# Patient Record
Sex: FEMALE
[De-identification: names, ages, dates, MRNs, and addresses within clinical notes are randomized per-mention and may not be internally consistent; named-entity substitution may affect disease eponyms.]

---

## 2017-12-29 ENCOUNTER — HOSPITAL ENCOUNTER (OUTPATIENT)
Dept: HOSPITAL 88 - PT | Age: 67
LOS: 2 days | End: 2017-12-31
Attending: SPECIALIST
Payer: COMMERCIAL

## 2017-12-29 DIAGNOSIS — M25.662: ICD-10-CM

## 2017-12-29 DIAGNOSIS — Z96.652: Primary | ICD-10-CM

## 2017-12-29 DIAGNOSIS — R26.2: ICD-10-CM

## 2017-12-29 DIAGNOSIS — M25.562: ICD-10-CM

## 2017-12-29 DIAGNOSIS — M62.81: ICD-10-CM

## 2017-12-29 DIAGNOSIS — Z47.1: ICD-10-CM

## 2018-01-31 ENCOUNTER — HOSPITAL ENCOUNTER (OUTPATIENT)
Dept: HOSPITAL 88 - PT | Age: 68
End: 2018-01-31
Attending: SPECIALIST
Payer: COMMERCIAL

## 2018-01-31 DIAGNOSIS — Z96.652: Primary | ICD-10-CM

## 2018-01-31 DIAGNOSIS — M25.562: ICD-10-CM

## 2018-01-31 DIAGNOSIS — M62.81: ICD-10-CM

## 2018-01-31 DIAGNOSIS — Z47.1: ICD-10-CM

## 2018-01-31 DIAGNOSIS — M25.662: ICD-10-CM

## 2018-01-31 DIAGNOSIS — R26.2: ICD-10-CM

## 2018-02-02 ENCOUNTER — HOSPITAL ENCOUNTER (OUTPATIENT)
Dept: HOSPITAL 88 - PT | Age: 68
LOS: 26 days | End: 2018-02-28
Attending: SPECIALIST
Payer: COMMERCIAL

## 2018-02-02 DIAGNOSIS — M25.661: ICD-10-CM

## 2018-02-02 DIAGNOSIS — Z96.652: Primary | ICD-10-CM

## 2018-02-02 DIAGNOSIS — Z47.1: ICD-10-CM

## 2018-02-02 DIAGNOSIS — M25.562: ICD-10-CM

## 2018-07-23 ENCOUNTER — HOSPITAL ENCOUNTER (OUTPATIENT)
Dept: HOSPITAL 88 - MAMMO | Age: 68
End: 2018-07-23
Attending: INTERNAL MEDICINE
Payer: COMMERCIAL

## 2018-07-23 DIAGNOSIS — Z12.31: Primary | ICD-10-CM

## 2018-07-23 PROCEDURE — 77067 SCR MAMMO BI INCL CAD: CPT

## 2018-08-17 ENCOUNTER — HOSPITAL ENCOUNTER (OUTPATIENT)
Dept: HOSPITAL 88 - RAD | Age: 68
End: 2018-08-17
Attending: INTERNAL MEDICINE
Payer: COMMERCIAL

## 2018-08-17 DIAGNOSIS — R10.9: Primary | ICD-10-CM

## 2018-08-17 PROCEDURE — 74018 RADEX ABDOMEN 1 VIEW: CPT

## 2018-08-17 NOTE — DIAGNOSTIC IMAGING REPORT
EXAM: ABDOMEN-1VIEW (KUB)



DATE: 8/17/2018 12:00 AM

 

INDICATION:         Pain



COMPARISON: 10/5/2017



FINDINGS:

Bowel gas pattern nonobstructive. Moderate stool. No distinct pneumoperitoneum,

suspicious calcification, or acute osseous finding.



IMPRESSION: 

Moderate stool.



Signed by: Dr. Artem Bucio MD on 8/17/2018 7:26 PM

## 2019-03-25 ENCOUNTER — HOSPITAL ENCOUNTER (OUTPATIENT)
Dept: HOSPITAL 88 - RAD | Age: 69
End: 2019-03-25
Attending: INTERNAL MEDICINE
Payer: COMMERCIAL

## 2019-03-25 DIAGNOSIS — M10.9: Primary | ICD-10-CM

## 2019-03-25 NOTE — DIAGNOSTIC IMAGING REPORT
Exam:  Left wrist Series.



History: Cellulitis, wrist pain, gout

 

Comparison: None.



Findings:  3 views of the left wrist. There is decreased bone mineralization.

Negative for acute, displaced  fracture or dislocation. The joint spaces are

relatively well-preserved.. No abnormal soft tissue calcification or mass. 

Mild soft tissue swelling in the wrist.



Impression:



1. Mild soft tissue swelling without acute underlying bony abnormalities.



Signed by: Dr. Rohit Kebede M.D. on 3/25/2019 6:40 PM

## 2019-10-10 ENCOUNTER — HOSPITAL ENCOUNTER (OUTPATIENT)
Dept: HOSPITAL 88 - MRI | Age: 69
End: 2019-10-10
Attending: INTERNAL MEDICINE
Payer: COMMERCIAL

## 2019-10-10 DIAGNOSIS — R20.0: Primary | ICD-10-CM

## 2019-10-10 PROCEDURE — 70551 MRI BRAIN STEM W/O DYE: CPT

## 2019-10-10 NOTE — DIAGNOSTIC IMAGING REPORT
EXAMINATION: MRI of the brain without contrast.



HISTORY: Left-sided headache tingling sensation, occasional headaches,

dizziness

COMPARISON: None.

TECHNIQUE: Sagittal T2; axial DWI, T2, FLAIR, T1-IR, T2 gradient echo; coronal

FLAIR. 



FINDINGS:

     Parenchyma: 

1.  Few scatter and periventricular white matter T2 FLAIR hyperintensities,

most likely nonspecific chronic microvascular ischemic changes. 

2.  No mass, hemorrhage, acute or chronic infarcts.



     Skull: Unremarkable.       

     Vessels: Expected flow voids present in the major arteries and dural

sinuses.

     Extra-axial spaces: No abnormal signal intensity or mass effect.

     Brain volume: Within normal limits for age.

     Ventricles: No hydrocephalus or displacement.

     Foramen magnum: Unremarkable.  

     Sella: Unremarkable.  

     Paranasal / mastoid sinuses: No significant inflammatory disease.



IMPRESSION:



1.  Mild white matter chronic microvascular ischemic changes.

2.  Otherwise no intracranial abnormalities, particularly no mass, hemorrhage,

infarct or demyelinating lesions.



Signed by: Dr. China Lemon M.D. on 10/10/2019 10:31 AM

## 2021-05-06 ENCOUNTER — HOSPITAL ENCOUNTER (OUTPATIENT)
Dept: HOSPITAL 88 - CT | Age: 71
End: 2021-05-06
Attending: INTERNAL MEDICINE
Payer: COMMERCIAL

## 2021-05-06 DIAGNOSIS — E11.51: Primary | ICD-10-CM

## 2021-05-06 LAB
BUN SERPL-MCNC: 20 MG/DL (ref 7–26)
BUN/CREAT SERPL: 23 (ref 6–25)
EGFRCR SERPLBLD CKD-EPI 2021: > 60 ML/MIN (ref 60–?)

## 2021-05-06 PROCEDURE — 75635 CT ANGIO ABDOMINAL ARTERIES: CPT

## 2021-05-06 PROCEDURE — 82565 ASSAY OF CREATININE: CPT

## 2021-05-06 PROCEDURE — 84520 ASSAY OF UREA NITROGEN: CPT

## 2021-05-06 PROCEDURE — 36415 COLL VENOUS BLD VENIPUNCTURE: CPT

## 2022-04-26 ENCOUNTER — HOSPITAL ENCOUNTER (OUTPATIENT)
Dept: HOSPITAL 88 - MAMMO | Age: 72
End: 2022-04-26
Attending: INTERNAL MEDICINE
Payer: COMMERCIAL

## 2022-04-26 DIAGNOSIS — Z12.31: Primary | ICD-10-CM

## 2022-04-26 PROCEDURE — 77067 SCR MAMMO BI INCL CAD: CPT

## 2022-10-07 ENCOUNTER — HOSPITAL ENCOUNTER (OUTPATIENT)
Dept: HOSPITAL 88 - ER | Age: 72
Setting detail: OBSERVATION
LOS: 2 days | Discharge: HOME | End: 2022-10-09
Attending: INTERNAL MEDICINE | Admitting: INTERNAL MEDICINE
Payer: COMMERCIAL

## 2022-10-07 VITALS — DIASTOLIC BLOOD PRESSURE: 50 MMHG | SYSTOLIC BLOOD PRESSURE: 146 MMHG

## 2022-10-07 VITALS — SYSTOLIC BLOOD PRESSURE: 160 MMHG | DIASTOLIC BLOOD PRESSURE: 67 MMHG

## 2022-10-07 VITALS — WEIGHT: 240 LBS | BODY MASS INDEX: 40.97 KG/M2 | HEIGHT: 64 IN

## 2022-10-07 VITALS — SYSTOLIC BLOOD PRESSURE: 146 MMHG | DIASTOLIC BLOOD PRESSURE: 50 MMHG

## 2022-10-07 VITALS — DIASTOLIC BLOOD PRESSURE: 67 MMHG | SYSTOLIC BLOOD PRESSURE: 160 MMHG

## 2022-10-07 DIAGNOSIS — Z83.3: ICD-10-CM

## 2022-10-07 DIAGNOSIS — Z88.1: ICD-10-CM

## 2022-10-07 DIAGNOSIS — E11.9: ICD-10-CM

## 2022-10-07 DIAGNOSIS — E78.5: ICD-10-CM

## 2022-10-07 DIAGNOSIS — M19.90: ICD-10-CM

## 2022-10-07 DIAGNOSIS — E66.01: ICD-10-CM

## 2022-10-07 DIAGNOSIS — Z20.822: ICD-10-CM

## 2022-10-07 DIAGNOSIS — R42: ICD-10-CM

## 2022-10-07 DIAGNOSIS — Z79.84: ICD-10-CM

## 2022-10-07 DIAGNOSIS — E86.0: Primary | ICD-10-CM

## 2022-10-07 DIAGNOSIS — G89.4: ICD-10-CM

## 2022-10-07 DIAGNOSIS — R55: ICD-10-CM

## 2022-10-07 DIAGNOSIS — Z88.8: ICD-10-CM

## 2022-10-07 DIAGNOSIS — I10: ICD-10-CM

## 2022-10-07 DIAGNOSIS — R53.1: ICD-10-CM

## 2022-10-07 DIAGNOSIS — Z82.49: ICD-10-CM

## 2022-10-07 LAB
ALBUMIN SERPL-MCNC: 4 G/DL (ref 3.5–5)
ALBUMIN/GLOB SERPL: 1.1 {RATIO} (ref 0.8–2)
ALP SERPL-CCNC: 71 IU/L (ref 40–150)
ALT SERPL-CCNC: 27 IU/L (ref 0–55)
ANION GAP SERPL CALC-SCNC: 18.1 MMOL/L (ref 8–16)
BACTERIA URNS QL MICRO: (no result) /HPF
BASOPHILS # BLD AUTO: 0.1 10*3/UL (ref 0–0.1)
BASOPHILS NFR BLD AUTO: 0.5 % (ref 0–1)
BUN SERPL-MCNC: 14 MG/DL (ref 7–26)
BUN/CREAT SERPL: 15 (ref 6–25)
CALCIUM SERPL-MCNC: 9.6 MG/DL (ref 8.4–10.2)
CHLORIDE SERPL-SCNC: 103 MMOL/L (ref 98–107)
CK MB SERPL-MCNC: 1 NG/ML (ref 0–5)
CK MB SERPL-MCNC: 1 NG/ML (ref 0–5)
CK SERPL-CCNC: 73 IU/L (ref 29–168)
CK SERPL-CCNC: 73 IU/L (ref 29–168)
CLARITY UR: CLEAR
CO2 SERPL-SCNC: 20 MMOL/L (ref 22–29)
COLOR UR: YELLOW
DEPRECATED NEUTROPHILS # BLD AUTO: 6.2 10*3/UL (ref 2.1–6.9)
DEPRECATED RBC URNS MANUAL-ACNC: (no result) /HPF (ref 0–5)
EOSINOPHIL # BLD AUTO: 0.1 10*3/UL (ref 0–0.4)
EOSINOPHIL NFR BLD AUTO: 0.4 % (ref 0–6)
EPI CELLS URNS QL MICRO: (no result) /LPF
ERYTHROCYTE [DISTWIDTH] IN CORD BLOOD: 13.2 % (ref 11.7–14.4)
GLOBULIN PLAS-MCNC: 3.7 G/DL (ref 2.3–3.5)
GLUCOSE SERPLBLD-MCNC: 157 MG/DL (ref 74–118)
HCT VFR BLD AUTO: 43 % (ref 34.2–44.1)
HGB BLD-MCNC: 14.8 G/DL (ref 12–16)
KETONES UR QL STRIP.AUTO: NEGATIVE
LEUKOCYTE ESTERASE UR QL STRIP.AUTO: NEGATIVE
LYMPHOCYTES # BLD: 4.6 10*3/UL (ref 1–3.2)
LYMPHOCYTES NFR BLD AUTO: 40.5 % (ref 18–39.1)
MCH RBC QN AUTO: 31.6 PG (ref 28–32)
MCHC RBC AUTO-ENTMCNC: 34.4 G/DL (ref 31–35)
MCV RBC AUTO: 91.9 FL (ref 81–99)
MONOCYTES # BLD AUTO: 0.5 10*3/UL (ref 0.2–0.8)
MONOCYTES NFR BLD AUTO: 4 % (ref 4.4–11.3)
NEUTS SEG NFR BLD AUTO: 54.2 % (ref 38.7–80)
NITRITE UR QL STRIP.AUTO: NEGATIVE
PLATELET # BLD AUTO: 251 X10E3/UL (ref 140–360)
POTASSIUM SERPL-SCNC: 4.1 MMOL/L (ref 3.5–5.1)
PROT UR QL STRIP.AUTO: NEGATIVE
RBC # BLD AUTO: 4.68 X10E6/UL (ref 3.6–5.1)
SODIUM SERPL-SCNC: 137 MMOL/L (ref 136–145)
SP GR UR STRIP: >=1.03 (ref 1.01–1.02)
UROBILINOGEN UR STRIP-MCNC: 0.2 MG/DL (ref 0.2–1)
WBC #/AREA URNS HPF: (no result) /HPF (ref 0–5)

## 2022-10-07 PROCEDURE — 82948 REAGENT STRIP/BLOOD GLUCOSE: CPT

## 2022-10-07 PROCEDURE — 82550 ASSAY OF CK (CPK): CPT

## 2022-10-07 PROCEDURE — 84100 ASSAY OF PHOSPHORUS: CPT

## 2022-10-07 PROCEDURE — 94799 UNLISTED PULMONARY SVC/PX: CPT

## 2022-10-07 PROCEDURE — 94760 N-INVAS EAR/PLS OXIMETRY 1: CPT

## 2022-10-07 PROCEDURE — 97161 PT EVAL LOW COMPLEX 20 MIN: CPT

## 2022-10-07 PROCEDURE — 80061 LIPID PANEL: CPT

## 2022-10-07 PROCEDURE — 83735 ASSAY OF MAGNESIUM: CPT

## 2022-10-07 PROCEDURE — 93306 TTE W/DOPPLER COMPLETE: CPT

## 2022-10-07 PROCEDURE — 81001 URINALYSIS AUTO W/SCOPE: CPT

## 2022-10-07 PROCEDURE — 80053 COMPREHEN METABOLIC PANEL: CPT

## 2022-10-07 PROCEDURE — 84443 ASSAY THYROID STIM HORMONE: CPT

## 2022-10-07 PROCEDURE — 97116 GAIT TRAINING THERAPY: CPT

## 2022-10-07 PROCEDURE — 84484 ASSAY OF TROPONIN QUANT: CPT

## 2022-10-07 PROCEDURE — 36415 COLL VENOUS BLD VENIPUNCTURE: CPT

## 2022-10-07 PROCEDURE — 99284 EMERGENCY DEPT VISIT MOD MDM: CPT

## 2022-10-07 PROCEDURE — 83036 HEMOGLOBIN GLYCOSYLATED A1C: CPT

## 2022-10-07 PROCEDURE — 70450 CT HEAD/BRAIN W/O DYE: CPT

## 2022-10-07 PROCEDURE — 85025 COMPLETE CBC W/AUTO DIFF WBC: CPT

## 2022-10-07 PROCEDURE — 82553 CREATINE MB FRACTION: CPT

## 2022-10-07 PROCEDURE — 71045 X-RAY EXAM CHEST 1 VIEW: CPT

## 2022-10-07 PROCEDURE — 93005 ELECTROCARDIOGRAM TRACING: CPT

## 2022-10-07 RX ADMIN — SODIUM CHLORIDE SCH MLS/HR: 9 INJECTION, SOLUTION INTRAVENOUS at 18:24

## 2022-10-08 VITALS — SYSTOLIC BLOOD PRESSURE: 156 MMHG | DIASTOLIC BLOOD PRESSURE: 62 MMHG

## 2022-10-08 VITALS — DIASTOLIC BLOOD PRESSURE: 40 MMHG | SYSTOLIC BLOOD PRESSURE: 134 MMHG

## 2022-10-08 VITALS — DIASTOLIC BLOOD PRESSURE: 58 MMHG | SYSTOLIC BLOOD PRESSURE: 134 MMHG

## 2022-10-08 VITALS — SYSTOLIC BLOOD PRESSURE: 120 MMHG | DIASTOLIC BLOOD PRESSURE: 57 MMHG

## 2022-10-08 VITALS — SYSTOLIC BLOOD PRESSURE: 116 MMHG | DIASTOLIC BLOOD PRESSURE: 77 MMHG

## 2022-10-08 VITALS — SYSTOLIC BLOOD PRESSURE: 134 MMHG | DIASTOLIC BLOOD PRESSURE: 58 MMHG

## 2022-10-08 VITALS — DIASTOLIC BLOOD PRESSURE: 58 MMHG | SYSTOLIC BLOOD PRESSURE: 139 MMHG

## 2022-10-08 LAB
ALBUMIN SERPL-MCNC: 3.1 G/DL (ref 3.5–5)
ALBUMIN/GLOB SERPL: 1 {RATIO} (ref 0.8–2)
ALP SERPL-CCNC: 55 IU/L (ref 40–150)
ALT SERPL-CCNC: 19 IU/L (ref 0–55)
ANION GAP SERPL CALC-SCNC: 13.9 MMOL/L (ref 8–16)
BASOPHILS # BLD AUTO: 0 10*3/UL (ref 0–0.1)
BASOPHILS NFR BLD AUTO: 0.6 % (ref 0–1)
BUN SERPL-MCNC: 12 MG/DL (ref 7–26)
BUN/CREAT SERPL: 13 (ref 6–25)
CALCIUM SERPL-MCNC: 8.4 MG/DL (ref 8.4–10.2)
CHLORIDE SERPL-SCNC: 110 MMOL/L (ref 98–107)
CK MB SERPL-MCNC: 1.2 NG/ML (ref 0–5)
CK SERPL-CCNC: 72 IU/L (ref 29–168)
CO2 SERPL-SCNC: 22 MMOL/L (ref 22–29)
DEPRECATED NEUTROPHILS # BLD AUTO: 2.6 10*3/UL (ref 2.1–6.9)
EOSINOPHIL # BLD AUTO: 0.2 10*3/UL (ref 0–0.4)
EOSINOPHIL NFR BLD AUTO: 2.3 % (ref 0–6)
ERYTHROCYTE [DISTWIDTH] IN CORD BLOOD: 13.5 % (ref 11.7–14.4)
GLOBULIN PLAS-MCNC: 3.2 G/DL (ref 2.3–3.5)
GLUCOSE SERPLBLD-MCNC: 110 MG/DL (ref 74–118)
HCT VFR BLD AUTO: 37.5 % (ref 34.2–44.1)
HGB BLD-MCNC: 12.3 G/DL (ref 12–16)
LYMPHOCYTES # BLD: 3.3 10*3/UL (ref 1–3.2)
LYMPHOCYTES NFR BLD AUTO: 50.6 % (ref 18–39.1)
MCH RBC QN AUTO: 31.1 PG (ref 28–32)
MCHC RBC AUTO-ENTMCNC: 32.8 G/DL (ref 31–35)
MCV RBC AUTO: 94.7 FL (ref 81–99)
MONOCYTES # BLD AUTO: 0.5 10*3/UL (ref 0.2–0.8)
MONOCYTES NFR BLD AUTO: 7 % (ref 4.4–11.3)
NEUTS SEG NFR BLD AUTO: 39.2 % (ref 38.7–80)
PLATELET # BLD AUTO: 201 X10E3/UL (ref 140–360)
POTASSIUM SERPL-SCNC: 3.9 MMOL/L (ref 3.5–5.1)
RBC # BLD AUTO: 3.96 X10E6/UL (ref 3.6–5.1)
SODIUM SERPL-SCNC: 142 MMOL/L (ref 136–145)

## 2022-10-08 RX ADMIN — SODIUM CHLORIDE SCH MLS/HR: 9 INJECTION, SOLUTION INTRAVENOUS at 22:40

## 2022-10-08 RX ADMIN — METOPROLOL SUCCINATE SCH MG: 50 TABLET, EXTENDED RELEASE ORAL at 12:25

## 2022-10-08 RX ADMIN — INSULIN LISPRO SCH UNIT: 100 INJECTION, SOLUTION INTRAVENOUS; SUBCUTANEOUS at 11:30

## 2022-10-08 RX ADMIN — INSULIN LISPRO SCH UNIT: 100 INJECTION, SOLUTION INTRAVENOUS; SUBCUTANEOUS at 20:54

## 2022-10-08 RX ADMIN — ENOXAPARIN SODIUM SCH MG: 40 INJECTION SUBCUTANEOUS at 09:19

## 2022-10-08 RX ADMIN — MEMANTINE HYDROCHLORIDE SCH MG: 10 TABLET ORAL at 17:22

## 2022-10-08 RX ADMIN — INSULIN LISPRO SCH UNIT: 100 INJECTION, SOLUTION INTRAVENOUS; SUBCUTANEOUS at 09:20

## 2022-10-08 RX ADMIN — SODIUM CHLORIDE SCH MLS/HR: 9 INJECTION, SOLUTION INTRAVENOUS at 13:00

## 2022-10-08 RX ADMIN — SODIUM CHLORIDE SCH MLS/HR: 9 INJECTION, SOLUTION INTRAVENOUS at 02:58

## 2022-10-08 RX ADMIN — INSULIN LISPRO SCH UNIT: 100 INJECTION, SOLUTION INTRAVENOUS; SUBCUTANEOUS at 16:30

## 2022-10-09 VITALS — DIASTOLIC BLOOD PRESSURE: 57 MMHG | SYSTOLIC BLOOD PRESSURE: 131 MMHG

## 2022-10-09 VITALS — DIASTOLIC BLOOD PRESSURE: 62 MMHG | SYSTOLIC BLOOD PRESSURE: 149 MMHG

## 2022-10-09 VITALS — SYSTOLIC BLOOD PRESSURE: 122 MMHG | DIASTOLIC BLOOD PRESSURE: 51 MMHG

## 2022-10-09 VITALS — DIASTOLIC BLOOD PRESSURE: 73 MMHG | SYSTOLIC BLOOD PRESSURE: 149 MMHG

## 2022-10-09 LAB
ALBUMIN SERPL-MCNC: 3 G/DL (ref 3.5–5)
ALBUMIN/GLOB SERPL: 1 {RATIO} (ref 0.8–2)
ALP SERPL-CCNC: 52 IU/L (ref 40–150)
ALT SERPL-CCNC: 20 IU/L (ref 0–55)
ANION GAP SERPL CALC-SCNC: 13.1 MMOL/L (ref 8–16)
BASOPHILS # BLD AUTO: 0.1 10*3/UL (ref 0–0.1)
BASOPHILS NFR BLD AUTO: 1 % (ref 0–1)
BUN SERPL-MCNC: 16 MG/DL (ref 7–26)
BUN/CREAT SERPL: 19 (ref 6–25)
CALCIUM SERPL-MCNC: 8.4 MG/DL (ref 8.4–10.2)
CHLORIDE SERPL-SCNC: 111 MMOL/L (ref 98–107)
CHOLEST SERPL-MCNC: 157 MD/DL (ref 0–199)
CHOLEST/HDLC SERPL: 6 {RATIO} (ref 3–3.6)
CO2 SERPL-SCNC: 23 MMOL/L (ref 22–29)
DEPRECATED NEUTROPHILS # BLD AUTO: 3.1 10*3/UL (ref 2.1–6.9)
DEPRECATED PHOSPHATE SERPL-MCNC: 3.4 MG/DL (ref 2.3–4.7)
EOSINOPHIL # BLD AUTO: 0.3 10*3/UL (ref 0–0.4)
EOSINOPHIL NFR BLD AUTO: 3.6 % (ref 0–6)
ERYTHROCYTE [DISTWIDTH] IN CORD BLOOD: 13.3 % (ref 11.7–14.4)
GLOBULIN PLAS-MCNC: 2.9 G/DL (ref 2.3–3.5)
GLUCOSE SERPLBLD-MCNC: 110 MG/DL (ref 74–118)
HCT VFR BLD AUTO: 37.2 % (ref 34.2–44.1)
HDLC SERPL-MSCNC: 26 MG/DL (ref 40–60)
HGB BLD-MCNC: 12.1 G/DL (ref 12–16)
LDLC SERPL CALC-MCNC: 110 MG/DL (ref 60–130)
LYMPHOCYTES # BLD: 3.4 10*3/UL (ref 1–3.2)
LYMPHOCYTES NFR BLD AUTO: 46.9 % (ref 18–39.1)
MAGNESIUM SERPL-MCNC: 1.9 MG/DL (ref 1.3–2.1)
MCH RBC QN AUTO: 30.9 PG (ref 28–32)
MCHC RBC AUTO-ENTMCNC: 32.5 G/DL (ref 31–35)
MCV RBC AUTO: 94.9 FL (ref 81–99)
MONOCYTES # BLD AUTO: 0.5 10*3/UL (ref 0.2–0.8)
MONOCYTES NFR BLD AUTO: 6.3 % (ref 4.4–11.3)
NEUTS SEG NFR BLD AUTO: 41.8 % (ref 38.7–80)
PLATELET # BLD AUTO: 179 X10E3/UL (ref 140–360)
POTASSIUM SERPL-SCNC: 4.1 MMOL/L (ref 3.5–5.1)
RBC # BLD AUTO: 3.92 X10E6/UL (ref 3.6–5.1)
SODIUM SERPL-SCNC: 143 MMOL/L (ref 136–145)
TRIGL SERPL-MCNC: 106 MG/DL (ref 0–149)
TSH SERPL DL<=0.005 MIU/L-ACNC: 3.01 UIU/ML (ref 0.35–4.94)

## 2022-10-09 RX ADMIN — MEMANTINE HYDROCHLORIDE SCH MG: 10 TABLET ORAL at 09:08

## 2022-10-09 RX ADMIN — INSULIN LISPRO SCH UNIT: 100 INJECTION, SOLUTION INTRAVENOUS; SUBCUTANEOUS at 07:30

## 2022-10-09 RX ADMIN — SODIUM CHLORIDE SCH MLS/HR: 9 INJECTION, SOLUTION INTRAVENOUS at 13:21

## 2022-10-09 RX ADMIN — METOPROLOL SUCCINATE SCH MG: 50 TABLET, EXTENDED RELEASE ORAL at 09:08

## 2022-10-09 RX ADMIN — ENOXAPARIN SODIUM SCH MG: 40 INJECTION SUBCUTANEOUS at 09:11

## 2022-10-09 RX ADMIN — INSULIN LISPRO SCH UNIT: 100 INJECTION, SOLUTION INTRAVENOUS; SUBCUTANEOUS at 13:05

## 2023-04-21 ENCOUNTER — HOSPITAL ENCOUNTER (OUTPATIENT)
Dept: HOSPITAL 88 - CT | Age: 73
End: 2023-04-21
Attending: INTERNAL MEDICINE
Payer: COMMERCIAL

## 2023-04-21 DIAGNOSIS — S06.0X0A: Primary | ICD-10-CM

## 2023-04-21 PROCEDURE — 70450 CT HEAD/BRAIN W/O DYE: CPT
